# Patient Record
Sex: FEMALE | Race: WHITE | NOT HISPANIC OR LATINO | Employment: OTHER | URBAN - METROPOLITAN AREA
[De-identification: names, ages, dates, MRNs, and addresses within clinical notes are randomized per-mention and may not be internally consistent; named-entity substitution may affect disease eponyms.]

---

## 2024-01-05 ENCOUNTER — OFFICE VISIT (OUTPATIENT)
Dept: UROLOGY | Facility: CLINIC | Age: 79
End: 2024-01-05
Payer: MEDICARE

## 2024-01-05 VITALS
HEIGHT: 67 IN | TEMPERATURE: 97.1 F | DIASTOLIC BLOOD PRESSURE: 61 MMHG | SYSTOLIC BLOOD PRESSURE: 110 MMHG | BODY MASS INDEX: 29.03 KG/M2 | WEIGHT: 185 LBS | HEART RATE: 64 BPM

## 2024-01-05 DIAGNOSIS — L90.0 LICHEN SCLEROSUS: Primary | ICD-10-CM

## 2024-01-05 DIAGNOSIS — N32.81 OVERACTIVE BLADDER: ICD-10-CM

## 2024-01-05 PROCEDURE — 1159F MED LIST DOCD IN RCRD: CPT | Performed by: OBSTETRICS & GYNECOLOGY

## 2024-01-05 PROCEDURE — 1036F TOBACCO NON-USER: CPT | Performed by: OBSTETRICS & GYNECOLOGY

## 2024-01-05 PROCEDURE — 99214 OFFICE O/P EST MOD 30 MIN: CPT | Performed by: OBSTETRICS & GYNECOLOGY

## 2024-01-05 RX ORDER — CLOBETASOL PROPIONATE 0.5 MG/G
OINTMENT TOPICAL
Qty: 60 G | Refills: 3 | Status: SHIPPED | OUTPATIENT
Start: 2024-01-05

## 2024-01-05 RX ORDER — CLOBETASOL PROPIONATE 0.5 MG/G
OINTMENT TOPICAL 2 TIMES DAILY
COMMUNITY
End: 2024-01-05 | Stop reason: ALTCHOICE

## 2024-01-05 RX ORDER — AMLODIPINE BESYLATE 5 MG/1
TABLET ORAL DAILY
COMMUNITY

## 2024-01-05 RX ORDER — MV-MIN/FA/VIT K/LUTEIN/ZEAXANT 200MCG-5MG
200 CAPSULE ORAL DAILY
COMMUNITY

## 2024-01-05 RX ORDER — ERGOCALCIFEROL 1.25 MG/1
1.25 CAPSULE ORAL
COMMUNITY

## 2024-01-05 RX ORDER — LEVOBUNOLOL HYDROCHLORIDE 5 MG/ML
1 SOLUTION/ DROPS OPHTHALMIC 2 TIMES DAILY
COMMUNITY
Start: 2023-11-13

## 2024-01-05 NOTE — PROGRESS NOTES
"FOLLOW-UP VISIT     PROBLEM LIST:  1. Lichen sclerosus       HISTORY OF PRESENT ILLNESS:   Kolby Rogers is a 78 y.o. female who presents on 01/05/24 for a follow up of lichen sclerosus. She reports no white patches, pruritus, or burning. She is still using clobetasol for maintenance of symptoms and would like a refill.      She was seen by Dr. De La O and was on Myrbetriq but expressed it was \"too much\" and she began cutting them in half. She later discontinued use.    She reports having vision problems because of dryness.     She tore her meniscus and while on medications she states her bladder \"forgot what it was supposed to do. \"    She had atrial fibrillation a year and a half ago but has since resolved.           PAST MEDICAL HISTORY:  Past Medical History:   Diagnosis Date    Personal history of other diseases of the circulatory system 10/14/2015    History of hypertension    Personal history of other diseases of the circulatory system     History of atrial fibrillation    Personal history of other diseases of the nervous system and sense organs 10/14/2015    History of glaucoma    Unspecified thoracic, thoracolumbar and lumbosacral intervertebral disc disorder 10/14/2015    Schmorl's nodes       PAST SURGICAL HISTORY:  Past Surgical History:   Procedure Laterality Date    BLADDER SURGERY  10/14/2015    Bladder Surgery    CHOLECYSTECTOMY  10/14/2015    Cholecystectomy    COLECTOMY  10/14/2015    Partial Colectomy    EYE SURGERY  10/14/2015    Eye Surgery    HYSTERECTOMY  10/14/2015    Hysterectomy        ALLERGIES:   No Known Allergies     MEDICATIONS:   [unfilled]     SOCIAL HISTORY:  Patient  reports that she quit smoking about 55 years ago. Her smoking use included cigarettes. She has never used smokeless tobacco. She reports that she does not currently use alcohol. She reports that she does not currently use drugs.   Social History     Socioeconomic History    Marital status: Single     Spouse name: Not on " "file    Number of children: Not on file    Years of education: Not on file    Highest education level: Not on file   Occupational History    Not on file   Tobacco Use    Smoking status: Former     Types: Cigarettes     Quit date:      Years since quittin.0    Smokeless tobacco: Never   Substance and Sexual Activity    Alcohol use: Not Currently    Drug use: Not Currently    Sexual activity: Not on file   Other Topics Concern    Not on file   Social History Narrative    Not on file     Social Determinants of Health     Financial Resource Strain: Not on file   Food Insecurity: Not on file   Transportation Needs: Not on file   Physical Activity: Not on file   Stress: Not on file   Social Connections: Not on file   Intimate Partner Violence: Not on file   Housing Stability: Not on file       FAMILY HISTORY:  No family history on file.    REVIEW OF SYSTEMS:  Constitutional: Negative for fever and chills. Denies anorexia, weight loss.  Eyes: Negative for visual disturbance.   Respiratory: Negative for shortness of breath.    Cardiovascular: Negative for chest pain.   Gastrointestinal: Negative for nausea and vomiting.   Genitourinary: See interval history above.  Skin: Negative for rash.   Neurological: Negative for dizziness and numbness.   Psychiatric/Behavioral: Negative for confusion and decreased concentration.     PHYSICAL EXAM:  Blood pressure 110/61, pulse 64, temperature 36.2 °C (97.1 °F), temperature source Temporal, height 1.702 m (5' 7\"), weight 83.9 kg (185 lb).  Constitutional: Patient appears well-developed and well-nourished. No distress.    Head: Normocephalic and atraumatic.    Neck: Normal range of motion.    Cardiovascular: Normal rate.    Pulmonary/Chest: Effort normal. No respiratory distress.   Abdominal: Normal  : labia resorbed as previous, without visible clitoral glans. No new lesions or signs that are concerning  Musculoskeletal: Normal range of motion.    Neurological: Alert and " oriented to person, place, and time.  Psychiatric: Normal mood and affect. Behavior is normal. Thought content normal.         Assessment:      1. Lichen sclerosus        2. Overactive bladder            Kolby Rogers is a 78 y.o. female with stable lichen sclerosus.     Plan:    Continue clobetasol maintenance.   Return next year for follow up.       Diana Dixon MD MPH      By signing my name below, I, Steve Hoskisn   attest that this documentation has been prepared under the direction and in the presence of Dr. Diana Dixon.

## 2025-01-30 NOTE — PROGRESS NOTES
"FOLLOW-UP VISIT       HISTORY OF PRESENT ILLNESS:   Kolby Rogers is a 79 y.o. female who presents to me today for follow-up of LS and OAB.     For LS, she continues to use clobetasol ointment and notes no white patches, pruritus, or burning. Would like to have this medication refilled.     She has previously used Myrbetriq for OAB however expressed it was \"too much\" and she began cutting them in half. She later discontinued use. Not interested in further treatment at this time.     PAST MEDICAL HISTORY:  Past Medical History:   Diagnosis Date    Personal history of other diseases of the circulatory system 10/14/2015    History of hypertension    Personal history of other diseases of the circulatory system     History of atrial fibrillation    Personal history of other diseases of the nervous system and sense organs 10/14/2015    History of glaucoma    Unspecified thoracic, thoracolumbar and lumbosacral intervertebral disc disorder 10/14/2015    Schmorl's nodes       PAST SURGICAL HISTORY:  Past Surgical History:   Procedure Laterality Date    BLADDER SURGERY  10/14/2015    Bladder Surgery    CHOLECYSTECTOMY  10/14/2015    Cholecystectomy    COLECTOMY  10/14/2015    Partial Colectomy    EYE SURGERY  10/14/2015    Eye Surgery    HYSTERECTOMY  10/14/2015    Hysterectomy        ALLERGIES:   Allergies   Allergen Reactions    Timolol Other and Shortness of breath    Brinzolamide-Brimonidine Other    Latanoprost Other     Red eyes and aching, headache, nausea    Methazolamide Other     headache, eyes turn red    Milk Other    Nickel Itching    Tafluprost Other    Tafluprost (Pf) Other     Aching, sore, red eyes, and headache    Dorzolamide GI Upset, Other and Rash     Headache, nausea, red, irritated skin around eyes        MEDICATIONS:   Medication Documentation Review Audit       Reviewed by Diana Robertson MA (Medical Assistant) on 01/31/25 at 0816      Medication Order Taking? Sig Documenting Provider Last Dose " Status   amLODIPine (Norvasc) 5 mg tablet [id number] Yes Take by mouth once daily. Historical Provider, MD  Active   clobetasol (Temovate) 0.05 % ointment [id number] Yes Apply to vulva BID x 6 weeks followed by daily until can wean to 2-3 x/week without a flare. Diana Dixon MD [facility]  Active   ergocalciferol (Vitamin D-2) 1.25 MG (82101 UT) capsule [id number] Yes Take 1 capsule (1,250 mcg) by mouth 1 (one) time per week. Historical Provider, MD  Active   flecainide (Tambocor) 50 mg tablet [id number] Yes  Historical Provider, MD  Active   levobunolol (Betagan) 0.5 % ophthalmic solution [id number] Yes Administer 1 drop into both eyes 2 times a day. Historical Provider, MD  Active   mv-min-FA-vit K-lutein-zeaxant (PreserVision AREDS 2 Plus MV) 200 mcg-15 mcg- 5 mg-1 mg capsule [id number] Yes Take 200 capsules by mouth once daily. Historical Provider, MD  Active   ofloxacin (Ocuflox) 0.3 % ophthalmic solution [id number] Yes Administer 1 drop into affected eye(s) 4 times a day. Historical Provider, MD  Active   prednisoLONE acetate (Pred-Forte) 1 % ophthalmic suspension [id number] Yes Administer 1 drop into affected eye(s) 4 times a day. Historical Provider, MD  Active   Xarelto 20 mg tablet [id number] Yes Take 1 tablet (20 mg) by mouth once daily in the evening. Take with meals. Historical Provider, MD  Active                     SOCIAL HISTORY:  Patient  reports that she quit smoking about 56 years ago. Her smoking use included cigarettes. She has never used smokeless tobacco. She reports that she does not currently use alcohol. She reports that she does not currently use drugs.   Social History     Socioeconomic History    Marital status: Single     Spouse name: Not on file    Number of children: Not on file    Years of education: Not on file    Highest education level: Not on file   Occupational History    Not on file   Tobacco Use    Smoking status: Former     Current packs/day: 0.00     Types: Cigarettes     Quit date: 1969  "    Years since quittin.1    Smokeless tobacco: Never   Substance and Sexual Activity    Alcohol use: Not Currently    Drug use: Not Currently    Sexual activity: Not on file   Other Topics Concern    Not on file   Social History Narrative    Not on file     Social Drivers of Health     Financial Resource Strain: Not on file   Food Insecurity: Not on file   Transportation Needs: Not on file   Physical Activity: Not on file   Stress: Not on file   Social Connections: Not on file   Intimate Partner Violence: Not on file   Housing Stability: Not on file       FAMILY HISTORY:  No family history on file.    REVIEW OF SYSTEMS:  Constitutional: Negative for fever and chills. Denies anorexia, weight loss.  Eyes: Negative for visual disturbance.   Respiratory: Negative for shortness of breath.    Cardiovascular: Negative for chest pain.   Gastrointestinal: Negative for nausea and vomiting.   Genitourinary: See interval history above.  Skin: Negative for rash.   Neurological: Negative for dizziness and numbness.   Psychiatric/Behavioral: Negative for confusion and decreased concentration.     PHYSICAL EXAM:  Blood pressure 147/79, pulse 60, height 1.702 m (5' 7\"), weight 85.6 kg (188 lb 12.8 oz).  Constitutional: Patient appears well-developed and well-nourished. No distress.    Head: Normocephalic and atraumatic.    Neck: Normal range of motion.    Cardiovascular: Normal rate.    Pulmonary/Chest: Effort normal. No respiratory distress.   : Labia resorbed as previous, without visible clitoral glans. No new lesions or signs that are concerning.   Musculoskeletal: Normal range of motion.    Neurological: Alert and oriented to person, place, and time.  Psychiatric: Normal mood and affect. Behavior is normal. Thought content normal.       Assessment:      1. Lichen sclerosus            Kolby Rogers is a 79 y.o. female with stable LS.       Plan:   Prescription for clobetasol ointment 0.05% sent to the patient's " pharmacy.   Follow-up in 1 year.       Diana Dixon MD MPH      Scribe Attestation  By signing my name below, I, Jana Mueller Scribe   attest that this documentation has been prepared under the direction and in the presence of Diana Dixon MD MPH.

## 2025-01-31 ENCOUNTER — APPOINTMENT (OUTPATIENT)
Dept: UROLOGY | Facility: CLINIC | Age: 80
End: 2025-01-31
Payer: MEDICARE

## 2025-01-31 VITALS
WEIGHT: 188.8 LBS | SYSTOLIC BLOOD PRESSURE: 147 MMHG | DIASTOLIC BLOOD PRESSURE: 79 MMHG | HEIGHT: 67 IN | BODY MASS INDEX: 29.63 KG/M2 | HEART RATE: 60 BPM

## 2025-01-31 DIAGNOSIS — L90.0 LICHEN SCLEROSUS: Primary | ICD-10-CM

## 2025-01-31 PROCEDURE — 99214 OFFICE O/P EST MOD 30 MIN: CPT | Performed by: OBSTETRICS & GYNECOLOGY

## 2025-01-31 PROCEDURE — 1159F MED LIST DOCD IN RCRD: CPT | Performed by: OBSTETRICS & GYNECOLOGY

## 2025-01-31 PROCEDURE — G2211 COMPLEX E/M VISIT ADD ON: HCPCS | Performed by: OBSTETRICS & GYNECOLOGY

## 2025-01-31 PROCEDURE — 1036F TOBACCO NON-USER: CPT | Performed by: OBSTETRICS & GYNECOLOGY

## 2025-01-31 RX ORDER — RIVAROXABAN 20 MG/1
20 TABLET, FILM COATED ORAL
COMMUNITY

## 2025-01-31 RX ORDER — OFLOXACIN 3 MG/ML
1 SOLUTION/ DROPS OPHTHALMIC 4 TIMES DAILY
COMMUNITY
Start: 2024-11-25

## 2025-01-31 RX ORDER — FLECAINIDE ACETATE 50 MG/1
TABLET ORAL
COMMUNITY
Start: 2025-01-30

## 2025-01-31 RX ORDER — PREDNISOLONE ACETATE 10 MG/ML
1 SUSPENSION/ DROPS OPHTHALMIC 4 TIMES DAILY
COMMUNITY
Start: 2024-09-04

## 2026-02-06 ENCOUNTER — APPOINTMENT (OUTPATIENT)
Dept: UROLOGY | Facility: CLINIC | Age: 81
End: 2026-02-06
Payer: MEDICARE